# Patient Record
(demographics unavailable — no encounter records)

---

## 2025-07-24 NOTE — HEALTH RISK ASSESSMENT
[Yes] : Yes [Monthly or less (1 pt)] : Monthly or less (1 point) [1 or 2 (0 pts)] : 1 or 2 (0 points) [Never (0 pts)] : Never (0 points) [No] : In the past 12 months have you used drugs other than those required for medical reasons? No [Little interest or pleasure doing things] : 1) Little interest or pleasure doing things [Feeling down, depressed, or hopeless] : 2) Feeling down, depressed, or hopeless [0] : 2) Feeling down, depressed, or hopeless: Not at all (0) [PHQ-2 Negative - No further assessment needed] : PHQ-2 Negative - No further assessment needed [I have developed a follow-up plan documented below in the note.] : I have developed a follow-up plan documented below in the note. [Patient reported PAP Smear was normal] : Patient reported PAP Smear was normal [HIV Test offered] : HIV Test offered [Hepatitis C test offered] : Hepatitis C test offered [With Family] : lives with family [# of Members in Household ___] :  household currently consist of [unfilled] member(s) [Employed] : employed [Single] : single [Sexually Active] : sexually active [Feels Safe at Home] : Feels safe at home [Reports normal functional visual acuity (ie: able to read med bottle)] : Reports normal functional visual acuity [Smoke Detector] : smoke detector [Carbon Monoxide Detector] : carbon monoxide detector [Safety elements used in home] : safety elements used in home [Seat Belt] :  uses seat belt [Sunscreen] : uses sunscreen [Never] : Never [NO] : No [Audit-CScore] : 1 [GWC2Gvwdx] : 0 [High Risk Behavior] : no high risk behavior [Reports changes in hearing] : Reports no changes in hearing [Reports changes in vision] : Reports no changes in vision [Reports changes in dental health] : Reports no changes in dental health [PapSmearDate] : 11/24 [de-identified] : All health risk assessments negative unless otherwise stated.  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

## 2025-07-24 NOTE — ASSESSMENT
[FreeTextEntry1] : 24 y/o female presents as a new patient to establish. She has no complaints today. She has a history of ovarian cysts for which she had surgically removed in 10/2024. She has reactions with sneezing and rash around cats and would like to have an allergy panel. She also has allergies when she goes outside and would like to know what she is allergic to. She has a history of iron deficiency anemia and it was improved after her ovarian cysts removal as she also had a diagnosis of endometriosis and had heavy menstruation prior to 10/2024. She would also like to discuss how to help lose weight. She has noticed difficulty losing weight since her surgery in 10/2024.  Allergies- Will check extensive allergy panel for pt Iron Def- Will check labs Obesity- Will check labs and send for nutrition eval. She believes that she had CPE in 1/2025.  no rashes , no suspicious lesions , no areas of discoloration , no jaundice present , good turgor , no masses , no tenderness on palpation [Vaccines Reviewed] : Immunizations reviewed today. Please see immunization details in the vaccine log within the immunization flowsheet.

## 2025-07-24 NOTE — HISTORY OF PRESENT ILLNESS
[FreeTextEntry1] : np establish care [de-identified] : 22 y/o female presents as a new patient to establish. She has no complaints today. She has a history of ovarian cysts for which she had surgically removed in 10/2024. She has reactions with sneezing and rash around cats and would like to have an allergy panel. She also has allergies when she goes outside and would like to know what she is allergic to. She has a history of iron deficiency anemia and it was improved after her ovarian cysts removal as she also had a diagnosis of endometriosis and had heavy menstruation prior to 10/2024. She would also like to discuss how to help lose weight. She has noticed difficulty losing weight since her surgery in 10/2024.